# Patient Record
Sex: FEMALE | URBAN - METROPOLITAN AREA
[De-identification: names, ages, dates, MRNs, and addresses within clinical notes are randomized per-mention and may not be internally consistent; named-entity substitution may affect disease eponyms.]

---

## 2023-02-02 LAB
AVERAGE GLUCOSE: NORMAL
CHOLESTEROL, TOTAL: 133 MG/DL
CHOLESTEROL/HDL RATIO: NORMAL
HBA1C MFR BLD: 6.7 %
HDLC SERPL-MCNC: 49 MG/DL (ref 35–70)
LDL CHOLESTEROL CALCULATED: 70 MG/DL (ref 0–160)
NONHDLC SERPL-MCNC: NORMAL MG/DL
TRIGL SERPL-MCNC: 71 MG/DL
VLDLC SERPL CALC-MCNC: 14 MG/DL

## 2023-03-31 ENCOUNTER — CLINICAL DOCUMENTATION (OUTPATIENT)
Dept: PHARMACY | Facility: CLINIC | Age: 63
End: 2023-03-31

## 2023-03-31 NOTE — PROGRESS NOTES
Pharmacy Pop Care Documentation:     AVS received for required office visit on:  3/30/23: Nathaly Grant per Care Everywhere

## 2023-03-31 NOTE — PROGRESS NOTES
Pharmacy Pop Care Documentation:   Patient has completed all the requirements by 04/25/23 and therefore will be enrolled in the DM Program on 05/01/23. Application received: 4/80/91  Application scanned. Letter mailed to patient.         Ivis Martinez, [address]   Phone: [phone]       For Pharmacy Admin Tracking Only    Program: [address]:  No  Gap Closed?: Yes   Time Spent (min): 15

## 2023-03-31 NOTE — PROGRESS NOTES
For Pharmacy Admin Tracking Only    Program: [address] in place:  No  Gap Closed?: Yes   Time Spent (min): 5

## 2023-03-31 NOTE — LETTER
Julius [address]  [address], [address]  Phone: [phone], option #3  Fax: [phone] [facility] [phone]           03/31/23     Dear Bella Angel,   Thanks so much for taking the first step towards better health. This letter is to inform you that we have received your enrollment form for the [facility] Well With Diabetes Program, but you are missing the following requirements or documentation:       A1C Result within the last 12 months      To qualify for this program the above requirements must be met by 04/25/23 for enrollment into the program on 05/01/23. Results or visits obtained outside of [facility] will need to be provided by fax: [phone] or email: [email or web address] before the deadline in order to qualify for the program.     If requirements are not met by the date listed above, you will be not be enrolled in the program.     Julius [address]  Phone: [phone], option #3  Email: [email or web address]  Fax Number: [phone]
[email or web address]  Fax: [phone]

## 2023-04-28 ENCOUNTER — TELEPHONE (OUTPATIENT)
Dept: PHARMACY | Facility: CLINIC | Age: 63
End: 2023-04-28

## 2023-05-01 ENCOUNTER — CLINICAL DOCUMENTATION (OUTPATIENT)
Dept: PHARMACY | Facility: CLINIC | Age: 63
End: 2023-05-01

## 2023-05-01 NOTE — PROGRESS NOTES
Pharmacy Pop Care Documentation:      The application for Sherin Ventura for enrollment into the diabetes management program has been reviewed and accepted on 5/1/23.     [address] Only    Program: [address] place:  No  Gap Closed?: Yes   Time Spent (min): 5

## 2023-05-08 NOTE — TELEPHONE ENCOUNTER
Patient returned call, scheduled 2023 Annual Pharmacist Visit 05.11.23 @ 5p      ===================================================================    For Pharmacy Admin Tracking Only    Program: [address] in place:  No  Recommendation Provided To: Patient/Caregiver: 1 via Telephone  Intervention Detail: Scheduled Appointment  Intervention Accepted By: Patient/Caregiver: 1  Gap Closed?: Yes   Time Spent (min): 5

## 2023-05-08 NOTE — TELEPHONE ENCOUNTER
Second attempt made to contact patient to schedule 2023 yearly pharmacist appointment to discuss medications for Diabetes Management Program.    No answer. Left VM. Please call back at [phone], option #3. Letter mailed to patient.        Syl Post, [address]   Phone: [phone], option #3       For Pharmacy Admin Tracking Only    Program: [address]:  No  Gap Closed?: No   Time Spent (min): 10

## 2023-05-11 ENCOUNTER — TELEPHONE (OUTPATIENT)
Dept: PHARMACY | Facility: CLINIC | Age: 63
End: 2023-05-11

## 2023-05-11 RX ORDER — SECUKINUMAB 150 MG/ML
INJECTION SUBCUTANEOUS
COMMUNITY

## 2023-05-11 RX ORDER — CETIRIZINE HYDROCHLORIDE 10 MG/1
10 TABLET ORAL
COMMUNITY
Start: 2016-12-09

## 2023-05-11 RX ORDER — OMEPRAZOLE 40 MG/1
CAPSULE, DELAYED RELEASE ORAL
COMMUNITY
Start: 2018-03-22 | End: 2023-05-11

## 2023-05-11 RX ORDER — MONTELUKAST SODIUM 10 MG/1
TABLET ORAL
COMMUNITY
Start: 2018-03-22 | End: 2023-05-11

## 2023-05-11 RX ORDER — MULTIVITAMIN
1 CAPSULE ORAL DAILY
COMMUNITY

## 2023-05-11 RX ORDER — CLOBETASOL PROPIONATE 0.5 MG/G
CREAM TOPICAL
COMMUNITY
Start: 2023-03-31

## 2023-05-11 RX ORDER — IBANDRONATE SODIUM 150 MG/1
TABLET, FILM COATED ORAL
COMMUNITY
Start: 2018-01-28 | End: 2023-05-11

## 2023-05-11 RX ORDER — CARVEDILOL 3.12 MG/1
TABLET ORAL
COMMUNITY
Start: 2017-08-21 | End: 2023-05-11

## 2023-05-11 RX ORDER — LEVOTHYROXINE SODIUM 0.05 MG/1
50 TABLET ORAL DAILY
COMMUNITY
Start: 2023-02-09

## 2023-05-11 RX ORDER — MULTIVIT-MIN/IRON/FOLIC ACID/K 18-600-40
CAPSULE ORAL
COMMUNITY
Start: 2016-06-02

## 2023-05-11 RX ORDER — BUDESONIDE AND FORMOTEROL FUMARATE DIHYDRATE 160; 4.5 UG/1; UG/1
2 AEROSOL RESPIRATORY (INHALATION) EVERY 12 HOURS
Qty: 2 EACH | Refills: 2 | COMMUNITY
Start: 2023-04-05 | End: 2023-07-04

## 2023-05-11 RX ORDER — ALBUTEROL SULFATE 2.5 MG/3ML
2.5 SOLUTION RESPIRATORY (INHALATION) EVERY 6 HOURS PRN
COMMUNITY
Start: 2023-04-17 | End: 2023-05-17

## 2023-05-11 RX ORDER — ATORVASTATIN CALCIUM 20 MG/1
20 TABLET, FILM COATED ORAL DAILY
COMMUNITY
Start: 2023-02-09

## 2023-05-11 RX ORDER — PANTOPRAZOLE SODIUM 40 MG/1
40 TABLET, DELAYED RELEASE ORAL DAILY
COMMUNITY
Start: 2023-02-14

## 2023-05-11 RX ORDER — ESTRADIOL 0.1 MG/G
CREAM VAGINAL
COMMUNITY
Start: 2023-03-31 | End: 2023-06-13

## 2023-05-11 NOTE — TELEPHONE ENCOUNTER
Delivery Pharmacy:  - Atorvastatin, Levothyroxine, Metformin  - [facility]     Diabetes Care:   - Glycemic Goal: <7.0%. Is at blood glucose goal. Type 2 DM under acceptable control as evidenced by A1c of 6.7%. - Current symptoms/problems include none  - Home blood sugar records:  patient tests 1 time(s) per day. Checks fasting BG occassionally and reports readings within range  - Any episodes of hypoglycemia? no  - Pt reports that she had been on Metformin BID, but reduced to once daily. Discussed options depending on next A1c such as increasing back to BID or adding something else such as SGLT, DPP4, or GLP1. Other Considerations:  - Blood Pressure Goal: BP less than 130/80 mmHg due to history of DM: Is at blood pressure goal.   - Lipids: Patient is prescribed moderate-intensity statin therapy. PLAN:  - Consideration(s) for provider:   None  - DM program gaps identified:   Initial requirements: Requirements met   Ongoing requirements: Provider visit for DM (2nd), A1c (2nd), Urine microalbumin, and Influenza vaccination for 2827-1961   - Education to patient: Discussed general issues about diabetes pathophysiology and management., Addressed diet and exercise, Addressed medication adherence, Overview of Be Well With Diabetes program, and Overview of HHP   - Follow up: PCP for identified gaps or as scheduled below  - Upcoming appointments:   Future Appointments   Date Time Provider Ana María Nelson   5/11/2023  5:00 PM SCHEDULE, [facility] None       W.  Tawanna Yuan, PharmD, [address] [facility]  Department, toll free: [phone]      For Pharmacy Admin Tracking Only    Program: [address]:  No  Gap Closed?: Yes   Time Spent (min): 45

## 2023-08-23 LAB
AVERAGE GLUCOSE: NORMAL
CREATININE, URINE: NORMAL
HBA1C MFR BLD: 6.7 %
MICROALBUMIN/CREAT 24H UR: NORMAL MG/G{CREAT}
MICROALBUMIN/CREAT UR-RTO: <30

## 2023-09-25 ENCOUNTER — CLINICAL DOCUMENTATION (OUTPATIENT)
Dept: PHARMACY | Facility: CLINIC | Age: 63
End: 2023-09-25

## 2023-09-25 NOTE — PROGRESS NOTES
Pharmacy Pop Care Documentation:     AVS received for required office visit on:  8/24/23 with Ward price careeverywhere    A1c and ROSALES updated    W.  Ponce Rand, PharmD,  [facility], toll free: [phone]

## 2024-01-24 ENCOUNTER — TELEPHONE (OUTPATIENT)
Dept: PHARMACY | Facility: CLINIC | Age: 64
End: 2024-01-24

## 2024-01-24 NOTE — TELEPHONE ENCOUNTER
**Patient is **Ensemble**    Called patient to schedule 2024 yearly pharmacist appointment to discuss medications for Diabetes Management Program.    Spoke to patient and appointment scheduled for 02/23/24 at 9:30am.    Martha Ramirez CphT    [facility]    Clinical Pharmacy     Department, toll free: [phone] Option #3     For Pharmacy Admin Tracking Only    Program: [employer]  CPA in place:  No  Recommendation Provided To: Patient/Caregiver: 1 via Telephone  Intervention Detail: Scheduled Appointment  Intervention Accepted By: Patient/Caregiver: 1  Gap Closed?: Yes   Time Spent (min): 10

## 2024-02-23 ENCOUNTER — TELEPHONE (OUTPATIENT)
Dept: PHARMACY | Facility: CLINIC | Age: 64
End: 2024-02-23

## 2024-02-23 RX ORDER — DOXYCYCLINE HYCLATE 50 MG/1
50 CAPSULE ORAL DAILY
COMMUNITY

## 2024-02-23 NOTE — TELEPHONE ENCOUNTER
Dominique called in to give is her SSN so she can enroll in [employer].     SSN updated in demographics.     She also requested her activation code. Received from pharmacist: [id number]    Abby Aldrich CPhT  Population Health    [facility] Clinical [facility]   [phone] option 2   
BCPS  Population Health Pharmacist  [facility] Clinical Pharmacy  Department, toll free: [phone], option 3       For Pharmacy Admin Tracking Only  Program: [employer]  CPA in place:  No  Gap Closed?: Yes   Time Spent (min): 45

## 2024-03-15 ENCOUNTER — CLINICAL DOCUMENTATION (OUTPATIENT)
Dept: PHARMACY | Facility: CLINIC | Age: 64
End: 2024-03-15

## 2024-03-15 NOTE — PROGRESS NOTES
Pharmacy Pop Care Documentation:     AVS received for required office visit on:  3/13/24: Makenna Johnson per Care Everywhere

## 2024-03-15 NOTE — PROGRESS NOTES
For Pharmacy Admin Tracking Only    Program: [facility]  CPA in place:  No  Gap Closed?: Yes   Time Spent (min): 5

## 2024-04-02 ENCOUNTER — PATIENT MESSAGE (OUTPATIENT)
Dept: PHARMACY | Facility: CLINIC | Age: 64
End: 2024-04-02

## 2024-04-02 ENCOUNTER — CLINICAL DOCUMENTATION (OUTPATIENT)
Dept: PHARMACY | Facility: CLINIC | Age: 64
End: 2024-04-02

## 2024-04-02 NOTE — PROGRESS NOTES
1st Quarterly Reminder sent to patient for the DM Program - See Mychart message or Letter for more information.    Yahaira Hearn CphT  [facility]  Clinical Pharmacy   Department, toll free: [phone] Option #3      For Pharmacy Admin Tracking Only    Program: [employer]  CPA in place:  No  Gap Closed?: Yes   Time Spent (min): 5

## 2024-04-10 NOTE — TELEPHONE ENCOUNTER
[employer] message not read by patient.  Letter mailed to patient with the information from the [employer] message.    Pedro Ayala   Clinical Pharmacy   Department, toll free: [phone] Option #3      For Pharmacy Admin Tracking Only    Program: [employer]  CPA in place:  No  Gap Closed?: Yes   Time Spent (min): 5

## 2024-06-24 ENCOUNTER — TELEPHONE (OUTPATIENT)
Dept: PHARMACY | Facility: CLINIC | Age: 64
End: 2024-06-24

## 2024-10-07 ENCOUNTER — TELEPHONE (OUTPATIENT)
Dept: PHARMACY | Facility: CLINIC | Age: 64
End: 2024-10-07

## 2024-10-07 NOTE — TELEPHONE ENCOUNTER
[facility] Employee Diabetes Program - Live Well With Diabetes    Quarterly Check-in  Quarterly Reminder sent to patient for the DM Program - See Mychart message or Letter for more information  Sent Mychart/Letter  Shiva Yuan, Shaggy, [facility]  Ambulatory Care Clinical Pharmacist- [facility] Clinical Pharmacy  Department, toll free: [phone]      =======================================================    Mychart/Letter for participant:      Thanks so much for taking the first step towards better health.    Please review the information below for requirements that need to be completed for the remainder of the year.    To ensure participants are taking steps to control their condition ongoing requirements need to be completed. To receive the Live Well With Diabetes Program benefit for 2025, the following actions must be taken:     Requirements to be completed by 12/31/24  Visit with your physician (Second yearly visit)  Second A1C    *Documentation of any requirements completed or reviewed outside of the [facility] electronic medical record will need to be faxed or emailed (fax/email info below).    If the missing requirements(s) are not met by the date listed above, you will be disqualified from the program and will not receive program benefits in 2025. If any patient is dis-enrolled from the program then they must wait a full calendar year to re-enroll in the program.   Please reach out to our team ASAP if there are any questions or concerns.    [facility]   Phone: [phone] Option #3  Email: [email or web address]  Fax Number: [phone]    For Pharmacy Admin Tracking Only    Program: [employer]  Time Spent (min): 5

## 2024-12-09 LAB
ESTIMATED AVERAGE GLUCOSE: NORMAL
HBA1C MFR BLD: 6.4 %

## 2024-12-17 ENCOUNTER — TELEPHONE (OUTPATIENT)
Dept: PHARMACY | Facility: CLINIC | Age: 64
End: 2024-12-17

## 2024-12-17 NOTE — TELEPHONE ENCOUNTER
[facility] Employee Diabetes Program - Live Well With Diabetes    Quarterly Check-in  Quarterly Reminder sent to patient for the DM Program - See Mychart message or Letter for more information  Sent Mychart/Letter  Shiva Yuan, PharmD, [facility]  Ambulatory Care Clinical Pharmacist- [facility] Clinical Pharmacy  Department, toll free: [phone]      =======================================================    Mychart/Letter for participant:      Thanks so much for taking the first step towards better health.    All requirements complete for 2024  Congratulations! You have completed all of the requirements needed to maintain enrollment in the Live Well With Diabetes Management program for 2024. You will be automatically re-enrolled in 2025.  Please reach out with any questions or concerns. Thank you!    [facility]   Phone: [phone] Option #3  Email: [email or web address]  Fax Number: [phone]    For Pharmacy Admin Tracking Only    Program: [employer]  Time Spent (min): 5

## 2025-02-19 ENCOUNTER — TELEPHONE (OUTPATIENT)
Dept: PHARMACY | Facility: CLINIC | Age: 65
End: 2025-02-19

## 2025-02-19 NOTE — TELEPHONE ENCOUNTER
**Patient is Ensemble**  Called patient to schedule 2025 yearly pharmacist appointment to discuss medications for Diabetes Management Program.    No answer. Number not in service      Petarcharley Sravan Alba [facility]  Clinical Pharmacy   Department, toll free: [phone] Option #3

## 2025-02-21 NOTE — TELEPHONE ENCOUNTER
Noted.    Yahaira Hearn [facility]   Population Health Clinical   [facility] Clinical Pharmacy  Department, toll free: [phone], option 3

## 2025-02-21 NOTE — TELEPHONE ENCOUNTER
Patient returned call and scheduled for 2/24/25 at 12:30PM    Juana Seay CPhT.   [facility] Clinical   [facility] Clinical Pharmacy  Toll free: [phone] Option 1     For Pharmacy Admin Tracking Only    Program: [employer]  CPA in place:  No  Recommendation Provided To: Patient/Caregiver: 1 via Telephone  Intervention Detail: Scheduled Appointment  Intervention Accepted By: Patient/Caregiver: 1  Gap Closed?: Yes   Time Spent (min): 5

## 2025-02-24 ENCOUNTER — TELEPHONE (OUTPATIENT)
Dept: PHARMACY | Facility: CLINIC | Age: 65
End: 2025-02-24

## 2025-02-24 NOTE — TELEPHONE ENCOUNTER
POPULATION HEALTH CLINICAL PHARMACY REVIEW -LIVE WELL WITH DIABETES ([facility])  =================================================================  Dominique Mcdowell is a 65 y.o. female enrolled in the [facility] Live Well with Diabetes program.    Two attempts made to reach patient by telephone for pharmacist appointment. Unable to connect call at patient's preferred # (states access code is required). [email or web address] message sent.    Tiffanie Pringle, PharmD, BCACP  Population Health Pharmacist  [facility] Clinical Pharmacy  Department, toll free: [phone], option 3    =======================================================   For Pharmacy Admin Tracking Only    Program: [facility]  CPA in place:  No  Gap Closed?: No   Time Spent (min): 5

## 2025-03-04 ENCOUNTER — TELEPHONE (OUTPATIENT)
Dept: PHARMACY | Facility: CLINIC | Age: 65
End: 2025-03-04

## 2025-03-04 RX ORDER — ALBUTEROL SULFATE 90 UG/1
2 INHALANT RESPIRATORY (INHALATION) EVERY 6 HOURS PRN
COMMUNITY

## 2025-03-04 NOTE — TELEPHONE ENCOUNTER
Dermatitis and Rash      Vitals/Labs:  BP Readings from Last 3 Encounters:   No data found for BP       Lab Results   Component Value Date    LABA1C 6.4 12/09/2024    LABA1C 6.5 07/15/2024    LABA1C 6.7 08/23/2023     No results found for: \"GXL7GBMY\"    Lab Results   Component Value Date    CHOL 116 07/15/2024    TRIG 125 07/15/2024    HDL 33 (A) 07/15/2024    LDL 58 07/15/2024     No results found for: \"ALT\", \"AST\"  The ASCVD Risk score (Shikha SAUNDERS, et al., 2019) failed to calculate for the following reasons:    The systolic blood pressure is missing    The valid total cholesterol range is 130 to 320 mg/dL    The smoking status is invalid     No results found for: \"CREATININE\"  CrCl cannot be calculated (No successful lab value found.).  No results found for: \"LABGLOM\"  Albumin/Creat Ratio, External   Date/Time Value Ref Range Status   07/15/2024 12:00 AM 13  Final     Comment:     ce       Immunizations:  Immunization History   Administered Date(s) Administered    COVID-19, MODERNA Bivalent, (age 12y+), IM, 50 mcg/0.5 mL 10/29/2022    COVID-19, MODERNA, (age 12y+), IM, 50mcg/0.5mL 12/08/2023    COVID-19, PFIZER, (age 12y+), IM, 30mcg/0.3mL 10/20/2024    COVID-19, US Vaccine, Vaccine Unspecified 01/06/2021, 02/05/2021, 10/29/2021    Influenza, FLUBLOK, (age 18 y+), Quadv PF, 0.5mL 10/29/2022, 10/21/2023    Pneumococcal, PCV20, PREVNAR 20, (age 6w+), IM, 0.5mL 06/25/2022    Pneumococcal, PPSV23, PNEUMOVAX 23, (age 2y+), SC/IM, 0.5mL 12/10/2007    RSV, ABRYSVO, (Pregnant or age 60y+), PF, IM, 0.5mL 11/27/2023    TDaP, ADACEL (age 10y-64y), BOOSTRIX (age 10y+), IM, 0.5mL 09/27/2007, 03/28/2023    Zoster Recombinant (Shingrix) 06/25/2022, 10/29/2022      Social History:  Social History     Tobacco Use    Smoking status: Not on file    Smokeless tobacco: Not on file   Substance Use Topics    Alcohol use: Not on file       ASSESSMENT:  Initial Program Requirements (Y indicates has completed for the year, N indicates needs

## 2025-06-11 ENCOUNTER — TELEPHONE (OUTPATIENT)
Dept: PHARMACY | Facility: CLINIC | Age: 65
End: 2025-06-11

## 2025-06-11 NOTE — TELEPHONE ENCOUNTER
[facility] Employee Diabetes Program - Live Well With Diabetes    Quarterly Check-in  Quarterly Reminder sent to patient for the DM Program - See Mychart message or Letter for more information  Called patient to discuss missing requirements  Left VM  Sent Heidihart  Shiva Miose [facility]  Population Health Clinical   [facility] Clinical Pharmacy  Department, toll free: [phone], option 3      For Pharmacy Admin Tracking Only    Program: [employer]  CPA in place:  No  Gap Closed?: No   Time Spent (min): 10       =======================================================    Mychart/Letter for participant:      Thanks so much for taking the first step towards better health.    To ensure participants are taking steps to control their condition, ongoing requirements need to be completed. To continue to receive the Live Well With Diabetes Program benefit for 2025, and be eligible in 2026, the following actions must be taken:     Requirements to be completed by 7/1/25  First A1C    Requirements to be completed by 12/31/25  Visit with your physician (Second yearly visit)  Second A1C  Lipid panel (once yearly)  Urine Microalbumin (once yearly)  Taking a Statin, have a contraindication, or a Provider override  Taking an ACEi/ARB, have a contraindication, or a Provider override    Requirement due by 12/31/25 if A1C is greater than 8 percent:  Engage with a Clinical pharmacy specialists by phone at least 2 times, or complete some form of diabetes education through your provider    *Documentation of any requirements completed or reviewed outside of the [facility] electronic medical record will need to be faxed or emailed (fax/email info below).    If the missing requirements(s) are not met by the date listed above, you will be disqualified from the program and will not receive program benefits. If any patient is dis-enrolled from the program then

## 2025-06-12 NOTE — TELEPHONE ENCOUNTER
Patient returned call about A1C deadline of 07/01/25 she will not be able to get that done in time. She is in [place] for birth of her granddaughter and will not be home until 07/03/25. She forgot about the first one being due by July.She apologized and said she will get her labwork done the minute she is back in [place]. Routing to pharmacist.    Ese Ruth CPhT.  Population Health Clinical   [facility] Clinical Pharmacy  Toll free: [phone] Option 1